# Patient Record
Sex: MALE | Race: WHITE
[De-identification: names, ages, dates, MRNs, and addresses within clinical notes are randomized per-mention and may not be internally consistent; named-entity substitution may affect disease eponyms.]

---

## 2018-08-01 ENCOUNTER — HOSPITAL ENCOUNTER (OUTPATIENT)
Dept: HOSPITAL 63 - US | Age: 83
Discharge: HOME | End: 2018-08-01
Attending: FAMILY MEDICINE
Payer: MEDICARE

## 2018-08-01 DIAGNOSIS — N62: Primary | ICD-10-CM

## 2018-08-01 PROCEDURE — 76641 ULTRASOUND BREAST COMPLETE: CPT

## 2018-08-01 NOTE — RAD
Bilateral breast ultrasound, 8/1/2018:



History: Right breast tenderness



There is a small amount of hypoechoic tissue in a triangular configuration in

the right retroareolar region compatible with minimal gynecomastia.  This is

asymmetric when compared to the left.  No other breast abnormality is

identified.





IMPRESSION: Minimal right gynecomastia.  Clinical surveillance is suggested. 

If symptoms worsen or a palpable nodule becomes evident, mammographic

evaluation may be indicated.